# Patient Record
Sex: MALE | Race: WHITE | NOT HISPANIC OR LATINO | URBAN - METROPOLITAN AREA
[De-identification: names, ages, dates, MRNs, and addresses within clinical notes are randomized per-mention and may not be internally consistent; named-entity substitution may affect disease eponyms.]

---

## 2023-08-25 ENCOUNTER — OFFICE (OUTPATIENT)
Dept: URBAN - METROPOLITAN AREA PATHOLOGY 1 | Facility: PATHOLOGY | Age: 63
End: 2023-08-25

## 2023-08-25 ENCOUNTER — AMBULATORY SURGICAL CENTER (OUTPATIENT)
Dept: URBAN - METROPOLITAN AREA SURGERY 4 | Facility: SURGERY | Age: 63
End: 2023-08-25
Payer: COMMERCIAL

## 2023-08-25 VITALS
SYSTOLIC BLOOD PRESSURE: 128 MMHG | OXYGEN SATURATION: 93 % | SYSTOLIC BLOOD PRESSURE: 131 MMHG | DIASTOLIC BLOOD PRESSURE: 81 MMHG | TEMPERATURE: 98.1 F | SYSTOLIC BLOOD PRESSURE: 131 MMHG | SYSTOLIC BLOOD PRESSURE: 136 MMHG | SYSTOLIC BLOOD PRESSURE: 134 MMHG | HEART RATE: 63 BPM | RESPIRATION RATE: 20 BRPM | SYSTOLIC BLOOD PRESSURE: 133 MMHG | DIASTOLIC BLOOD PRESSURE: 83 MMHG | SYSTOLIC BLOOD PRESSURE: 150 MMHG | HEART RATE: 68 BPM | DIASTOLIC BLOOD PRESSURE: 70 MMHG | SYSTOLIC BLOOD PRESSURE: 138 MMHG | DIASTOLIC BLOOD PRESSURE: 74 MMHG | SYSTOLIC BLOOD PRESSURE: 150 MMHG | HEART RATE: 65 BPM | OXYGEN SATURATION: 96 % | HEART RATE: 63 BPM | SYSTOLIC BLOOD PRESSURE: 119 MMHG | DIASTOLIC BLOOD PRESSURE: 71 MMHG | OXYGEN SATURATION: 93 % | HEART RATE: 71 BPM | HEART RATE: 76 BPM | HEART RATE: 68 BPM | HEART RATE: 75 BPM | RESPIRATION RATE: 18 BRPM | OXYGEN SATURATION: 91 % | RESPIRATION RATE: 17 BRPM | HEART RATE: 69 BPM | RESPIRATION RATE: 22 BRPM | RESPIRATION RATE: 19 BRPM | DIASTOLIC BLOOD PRESSURE: 66 MMHG | RESPIRATION RATE: 22 BRPM | SYSTOLIC BLOOD PRESSURE: 134 MMHG | HEART RATE: 69 BPM | DIASTOLIC BLOOD PRESSURE: 88 MMHG | SYSTOLIC BLOOD PRESSURE: 130 MMHG | RESPIRATION RATE: 19 BRPM | SYSTOLIC BLOOD PRESSURE: 132 MMHG | DIASTOLIC BLOOD PRESSURE: 74 MMHG | SYSTOLIC BLOOD PRESSURE: 129 MMHG | DIASTOLIC BLOOD PRESSURE: 88 MMHG | DIASTOLIC BLOOD PRESSURE: 76 MMHG | OXYGEN SATURATION: 98 % | HEIGHT: 78 IN | DIASTOLIC BLOOD PRESSURE: 85 MMHG | HEART RATE: 73 BPM | RESPIRATION RATE: 16 BRPM | HEART RATE: 64 BPM | RESPIRATION RATE: 18 BRPM | WEIGHT: 280 LBS | OXYGEN SATURATION: 98 % | SYSTOLIC BLOOD PRESSURE: 133 MMHG | OXYGEN SATURATION: 96 % | OXYGEN SATURATION: 95 % | DIASTOLIC BLOOD PRESSURE: 70 MMHG | DIASTOLIC BLOOD PRESSURE: 81 MMHG | DIASTOLIC BLOOD PRESSURE: 83 MMHG | SYSTOLIC BLOOD PRESSURE: 128 MMHG | RESPIRATION RATE: 20 BRPM | HEART RATE: 64 BPM | DIASTOLIC BLOOD PRESSURE: 94 MMHG | DIASTOLIC BLOOD PRESSURE: 60 MMHG | RESPIRATION RATE: 16 BRPM | SYSTOLIC BLOOD PRESSURE: 138 MMHG | DIASTOLIC BLOOD PRESSURE: 94 MMHG | SYSTOLIC BLOOD PRESSURE: 140 MMHG | RESPIRATION RATE: 8 BRPM | WEIGHT: 280 LBS | TEMPERATURE: 98.1 F | DIASTOLIC BLOOD PRESSURE: 71 MMHG | HEART RATE: 71 BPM | OXYGEN SATURATION: 92 % | OXYGEN SATURATION: 91 % | DIASTOLIC BLOOD PRESSURE: 66 MMHG | HEART RATE: 73 BPM | SYSTOLIC BLOOD PRESSURE: 147 MMHG | SYSTOLIC BLOOD PRESSURE: 129 MMHG | OXYGEN SATURATION: 92 % | DIASTOLIC BLOOD PRESSURE: 76 MMHG | RESPIRATION RATE: 17 BRPM | SYSTOLIC BLOOD PRESSURE: 132 MMHG | HEART RATE: 76 BPM | DIASTOLIC BLOOD PRESSURE: 60 MMHG | OXYGEN SATURATION: 95 % | SYSTOLIC BLOOD PRESSURE: 140 MMHG | SYSTOLIC BLOOD PRESSURE: 119 MMHG | SYSTOLIC BLOOD PRESSURE: 130 MMHG | HEIGHT: 78 IN | HEART RATE: 75 BPM | SYSTOLIC BLOOD PRESSURE: 136 MMHG | SYSTOLIC BLOOD PRESSURE: 147 MMHG | RESPIRATION RATE: 8 BRPM | DIASTOLIC BLOOD PRESSURE: 85 MMHG | HEART RATE: 65 BPM

## 2023-08-25 DIAGNOSIS — K22.89 OTHER SPECIFIED DISEASE OF ESOPHAGUS: ICD-10-CM

## 2023-08-25 DIAGNOSIS — K57.30 DIVERTICULOSIS OF LARGE INTESTINE WITHOUT PERFORATION OR ABS: ICD-10-CM

## 2023-08-25 DIAGNOSIS — Z86.010 PERSONAL HISTORY OF COLONIC POLYPS: ICD-10-CM

## 2023-08-25 DIAGNOSIS — K22.70 BARRETT'S ESOPHAGUS WITHOUT DYSPLASIA: ICD-10-CM

## 2023-08-25 DIAGNOSIS — K62.89 OTHER SPECIFIED DISEASES OF ANUS AND RECTUM: ICD-10-CM

## 2023-08-25 DIAGNOSIS — D12.2 BENIGN NEOPLASM OF ASCENDING COLON: ICD-10-CM

## 2023-08-25 DIAGNOSIS — K63.5 POLYP OF COLON: ICD-10-CM

## 2023-08-25 PROCEDURE — 45385 COLONOSCOPY W/LESION REMOVAL: CPT | Mod: 33 | Performed by: INTERNAL MEDICINE

## 2023-08-25 PROCEDURE — 43239 EGD BIOPSY SINGLE/MULTIPLE: CPT | Performed by: INTERNAL MEDICINE

## 2023-08-25 PROCEDURE — 45385 COLONOSCOPY W/LESION REMOVAL: CPT | Mod: PT | Performed by: INTERNAL MEDICINE

## 2023-08-25 PROCEDURE — 88305 TISSUE EXAM BY PATHOLOGIST: CPT | Performed by: PATHOLOGY

## 2023-08-25 NOTE — SERVICEHPINOTES
This patient has a history of non-dysplastic Ching's metaplasia and is now seen for follow-up and surveillance.Biopsies of the GE junction in 2014 did not show Ching's esophagus. This patient has a personal history of adenomatous colon polyps and is now seen for colorectal surveillance/screening.Tubular adenomas were removed in 2011 and 2014.span id="{[id number]}" class="narrative freetextSelected" type="freetext" canedit="true" suppressed="false" nid="[id number]" gid="{[id number]}" bound="false" visited="true" /spanspan id="{[id number]}" class="narrative placeholderNormal" type="placeholder" canedit="true" suppressed="true" text="[ROS Wording]" nid="[id number]" gid="{[id number]}" propertyname="rosWording" displayname="ROS Wording" tooltip="" handler="" handlerdata="" datatype="text" mandatory="false" requires="" allowmultipleentries="" describes="" tagname="" prototype="" dontshowempty="false" empty="true" onmouseover="__NarrativeOnMouseOver('{[id number]}')" onmouseout="__NarrativeOnMouseOut('{[id number]}')" onmousedown="__NarrativePlaceholderClicked('{[id number]}')"/spanspan id="{[id number]}" class="narrative freetextNormal" type="freetext" canedit="true" suppressed="false" nid="[id number]" gid="{[id number]}" bound="false" /span

## 2023-08-25 NOTE — SERVICEHPINOTES
This patient has a history of non-dysplastic Ching's metaplasia and is now seen for follow-up and surveillance.Biopsies of the GE junction in 2014 did not show Ching's esophagus. This patient has a personal history of adenomatous colon polyps and is now seen for colorectal surveillance/screening.Tubular adenomas were removed in 2011 and 2014.span id="{[id number]}" class="narrative freetextSelected" type="freetext" canedit="true" suppressed="false" nid="[id number]" gid="{[id number]}" bound="false" visited="true" /spanspan id="{[id number]}" class="narrative placeholderNormal" type="placeholder" canedit="true" suppressed="true" text="[ROS Wording]" nid="[id number]" gid="{[id number]}" propertyname="rosWording" displayname="ROS Wording" tooltip="" handler="" handlerdata="" datatype="text" mandatory="false" requires="" allowmultipleentries="" describes="" tagname="" prototype="" dontshowempty="false" empty="true" onmouseover="__NarrativeOnMouseOver('{[id number]}')" onmouseout="__NarrativeOnMouseOut('{[id number]}')" onmousedown="__NarrativePlaceholderClicked('{[id number]}')"/spanspan id="{[id number]}" class="narrative freetextNormal" type="freetext" canedit="true" suppressed="false" nid="[id number]" gid="{[id number]}" bound="false" /span

## 2023-09-01 LAB
PDF: PDF REPORT: (no result)
PDF: PDF REPORT: (no result)